# Patient Record
Sex: MALE | Race: WHITE
[De-identification: names, ages, dates, MRNs, and addresses within clinical notes are randomized per-mention and may not be internally consistent; named-entity substitution may affect disease eponyms.]

---

## 2020-02-13 ENCOUNTER — HOSPITAL ENCOUNTER (INPATIENT)
Dept: HOSPITAL 61 - ER | Age: 75
LOS: 1 days | Discharge: HOME | DRG: 178 | End: 2020-02-14
Attending: INTERNAL MEDICINE | Admitting: INTERNAL MEDICINE
Payer: COMMERCIAL

## 2020-02-13 VITALS — DIASTOLIC BLOOD PRESSURE: 69 MMHG | SYSTOLIC BLOOD PRESSURE: 116 MMHG

## 2020-02-13 VITALS — BODY MASS INDEX: 38.26 KG/M2 | HEIGHT: 68 IN | WEIGHT: 252.43 LBS

## 2020-02-13 VITALS — DIASTOLIC BLOOD PRESSURE: 50 MMHG | SYSTOLIC BLOOD PRESSURE: 99 MMHG

## 2020-02-13 DIAGNOSIS — I25.10: ICD-10-CM

## 2020-02-13 DIAGNOSIS — K21.9: ICD-10-CM

## 2020-02-13 DIAGNOSIS — R09.1: ICD-10-CM

## 2020-02-13 DIAGNOSIS — E66.9: ICD-10-CM

## 2020-02-13 DIAGNOSIS — R59.0: ICD-10-CM

## 2020-02-13 DIAGNOSIS — J15.6: Primary | ICD-10-CM

## 2020-02-13 DIAGNOSIS — Z79.899: ICD-10-CM

## 2020-02-13 DIAGNOSIS — Z79.84: ICD-10-CM

## 2020-02-13 DIAGNOSIS — I25.2: ICD-10-CM

## 2020-02-13 DIAGNOSIS — E83.42: ICD-10-CM

## 2020-02-13 DIAGNOSIS — J98.11: ICD-10-CM

## 2020-02-13 DIAGNOSIS — Z79.82: ICD-10-CM

## 2020-02-13 DIAGNOSIS — I10: ICD-10-CM

## 2020-02-13 DIAGNOSIS — G47.33: ICD-10-CM

## 2020-02-13 DIAGNOSIS — E11.9: ICD-10-CM

## 2020-02-13 LAB
ALBUMIN SERPL-MCNC: 3.8 G/DL (ref 3.4–5)
ALBUMIN/GLOB SERPL: 1.4 {RATIO} (ref 1–1.7)
ALP SERPL-CCNC: 45 U/L (ref 46–116)
ALT SERPL-CCNC: 32 U/L (ref 16–63)
ANION GAP SERPL CALC-SCNC: 11 MMOL/L (ref 6–14)
AST SERPL-CCNC: 23 U/L (ref 15–37)
BASOPHILS # BLD AUTO: 0 X10^3/UL (ref 0–0.2)
BASOPHILS NFR BLD: 0 % (ref 0–3)
BILIRUB SERPL-MCNC: 0.9 MG/DL (ref 0.2–1)
BUN SERPL-MCNC: 19 MG/DL (ref 8–26)
BUN/CREAT SERPL: 17 (ref 6–20)
CALCIUM SERPL-MCNC: 8.9 MG/DL (ref 8.5–10.1)
CHLORIDE SERPL-SCNC: 102 MMOL/L (ref 98–107)
CK SERPL-CCNC: 171 U/L (ref 39–308)
CO2 SERPL-SCNC: 28 MMOL/L (ref 21–32)
CREAT SERPL-MCNC: 1.1 MG/DL (ref 0.7–1.3)
EOSINOPHIL NFR BLD: 0 % (ref 0–3)
EOSINOPHIL NFR BLD: 0 X10^3/UL (ref 0–0.7)
ERYTHROCYTE [DISTWIDTH] IN BLOOD BY AUTOMATED COUNT: 13.6 % (ref 11.5–14.5)
GFR SERPLBLD BASED ON 1.73 SQ M-ARVRAT: 65.6 ML/MIN
GLOBULIN SER-MCNC: 2.8 G/DL (ref 2.2–3.8)
GLUCOSE SERPL-MCNC: 137 MG/DL (ref 70–99)
HCT VFR BLD CALC: 37 % (ref 39–53)
HGB BLD-MCNC: 12.6 G/DL (ref 13–17.5)
INFLUENZA A PATIENT: NEGATIVE
INFLUENZA B PATIENT: NEGATIVE
LYMPHOCYTES # BLD: 1.2 X10^3/UL (ref 1–4.8)
LYMPHOCYTES NFR BLD AUTO: 11 % (ref 24–48)
MAGNESIUM SERPL-MCNC: 1.4 MG/DL (ref 1.8–2.4)
MCH RBC QN AUTO: 31 PG (ref 25–35)
MCHC RBC AUTO-ENTMCNC: 34 G/DL (ref 31–37)
MCV RBC AUTO: 90 FL (ref 79–100)
MONO #: 1 X10^3/UL (ref 0–1.1)
MONOCYTES NFR BLD: 10 % (ref 0–9)
NEUT #: 8.4 X10^3/UL (ref 1.8–7.7)
NEUTROPHILS NFR BLD AUTO: 79 % (ref 31–73)
PLATELET # BLD AUTO: 221 X10^3/UL (ref 140–400)
POTASSIUM SERPL-SCNC: 3.9 MMOL/L (ref 3.5–5.1)
PROT SERPL-MCNC: 6.6 G/DL (ref 6.4–8.2)
RBC # BLD AUTO: 4.1 X10^6/UL (ref 4.3–5.7)
SODIUM SERPL-SCNC: 141 MMOL/L (ref 136–145)
WBC # BLD AUTO: 10.7 X10^3/UL (ref 4–11)

## 2020-02-13 PROCEDURE — 85025 COMPLETE CBC W/AUTO DIFF WBC: CPT

## 2020-02-13 PROCEDURE — 94640 AIRWAY INHALATION TREATMENT: CPT

## 2020-02-13 PROCEDURE — 84145 PROCALCITONIN (PCT): CPT

## 2020-02-13 PROCEDURE — G0378 HOSPITAL OBSERVATION PER HR: HCPCS

## 2020-02-13 PROCEDURE — 94760 N-INVAS EAR/PLS OXIMETRY 1: CPT

## 2020-02-13 PROCEDURE — 96367 TX/PROPH/DG ADDL SEQ IV INF: CPT

## 2020-02-13 PROCEDURE — 82962 GLUCOSE BLOOD TEST: CPT

## 2020-02-13 PROCEDURE — 80053 COMPREHEN METABOLIC PANEL: CPT

## 2020-02-13 PROCEDURE — 83735 ASSAY OF MAGNESIUM: CPT

## 2020-02-13 PROCEDURE — 87040 BLOOD CULTURE FOR BACTERIA: CPT

## 2020-02-13 PROCEDURE — 71045 X-RAY EXAM CHEST 1 VIEW: CPT

## 2020-02-13 PROCEDURE — 96365 THER/PROPH/DIAG IV INF INIT: CPT

## 2020-02-13 PROCEDURE — 93005 ELECTROCARDIOGRAM TRACING: CPT

## 2020-02-13 PROCEDURE — 71275 CT ANGIOGRAPHY CHEST: CPT

## 2020-02-13 PROCEDURE — 96366 THER/PROPH/DIAG IV INF ADDON: CPT

## 2020-02-13 PROCEDURE — 84484 ASSAY OF TROPONIN QUANT: CPT

## 2020-02-13 PROCEDURE — 83605 ASSAY OF LACTIC ACID: CPT

## 2020-02-13 PROCEDURE — 82553 CREATINE MB FRACTION: CPT

## 2020-02-13 PROCEDURE — 83880 ASSAY OF NATRIURETIC PEPTIDE: CPT

## 2020-02-13 PROCEDURE — 5A09357 ASSISTANCE WITH RESPIRATORY VENTILATION, LESS THAN 24 CONSECUTIVE HOURS, CONTINUOUS POSITIVE AIRWAY PRESSURE: ICD-10-PCS | Performed by: INTERNAL MEDICINE

## 2020-02-13 PROCEDURE — 36415 COLL VENOUS BLD VENIPUNCTURE: CPT

## 2020-02-13 PROCEDURE — 87804 INFLUENZA ASSAY W/OPTIC: CPT

## 2020-02-13 PROCEDURE — 96368 THER/DIAG CONCURRENT INF: CPT

## 2020-02-13 RX ADMIN — IPRATROPIUM BROMIDE AND ALBUTEROL SULFATE SCH ML: .5; 3 SOLUTION RESPIRATORY (INHALATION) at 21:09

## 2020-02-13 RX ADMIN — PIPERACILLIN SODIUM AND TAZOBACTAM SODIUM SCH MLS/HR: 4; .5 INJECTION, POWDER, LYOPHILIZED, FOR SOLUTION INTRAVENOUS at 23:03

## 2020-02-13 NOTE — NUR
Pt. arrived on unit at 1935 by bed from ER. Pt. complains of pain being 7/10 but feels 
comfortable. Admission assessment and questions were done at this time. Pt. is sitting in 
his chair with call light in reach. Will continue to monitor.

## 2020-02-13 NOTE — PDOC1
History and Physical


Date of Admission


Date of Admission


DATE: 2/13/20 


TIME: 21:05





Source


Source:  Chart review, Patient





History of Present Illness


History of Present Illness


 Raj is a 75  year old male  admit from  the ED today complaining of cough 

and shortness of breath that began on Monday. Patient reports being seen at 

urgent care on Monday and was given doxycycline and prednisone. He reports 

symptoms did not improve. He went back on Wednesday, he was given Tessalon 

Perles. He states symptoms have not improved he continues to have some symptoms.

Patient denies any fever. Denies any nasal congestion.





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





Past Medical History


Cardiovascular:  CAD (MI in 2003), HTN


Pulmonary:  No pertinent hx


Hepatobiliary:  No pertinent hx


Psych:  No pertinent hx


ENT:  No pertinent hx


Endocrine:  Diabetes


Dermatology:  No pertinent hx





Social History


Smoke:  No


ALCOHOL:  rare


Drugs:  None





Current Problem List


Problem List


Problems


Medical Problems:


(1) Hypomagnesemia


Status: Acute  





(2) Left lower lobe pneumonia


Status: Acute  











Current Medications


Current Medications





Current Medications


Piperacillin Sod/ Tazobactam Sod 4.5 gm/Sodium Chloride 100 ml @  200 mls/hr 1X 

ONCE IV  Last administered on 2/13/20at 16:10;  Start 2/13/20 at 15:45;  Stop 

2/13/20 at 16:14;  Status DC


Levofloxacin/ Dextrose 150 ml @  100 mls/hr 1X  ONCE IV  Last administered on 

2/13/20at 16:57;  Start 2/13/20 at 15:45;  Stop 2/13/20 at 17:14;  Status DC


Albuterol/ Ipratropium (Duoneb) 3 ml 1X  ONCE NEB  Last administered on 

2/13/20at 15:51;  Start 2/13/20 at 15:45;  Stop 2/13/20 at 16:04;  Status DC


Ondansetron HCl (Zofran) 4 mg PRN Q8HRS  PRN IV NAUSEA/VOMITING;  Start 2/13/20 

at 17:45;  Stop 2/14/20 at 17:44


Morphine Sulfate (Morphine Sulfate) 2 mg PRN Q2HR  PRN IV PAIN;  Start 2/13/20 

at 17:45;  Stop 2/14/20 at 17:44


Acetaminophen (Tylenol) 650 mg PRN Q4HRS  PRN PO FEVER;  Start 2/13/20 at 17:45;

 Stop 2/14/20 at 17:44


Albuterol/ Ipratropium (Duoneb) 3 ml RTQID NEB ;  Start 2/13/20 at 20:00;  Stop 

2/14/20 at 19:59


Magnesium Sulfate/ Dextrose 100 ml @  100 mls/hr 1X  ONCE IV  Last administered 

on 2/13/20at 18:23;  Start 2/13/20 at 17:45;  Stop 2/13/20 at 18:44;  Status DC


Magnesium Sulfate 50 ml @ 25 mls/hr 1X  ONCE IV ;  Start 2/13/20 at 21:00;  Stop

2/13/20 at 22:59


Metoprolol Tartrate (Lopressor) 25 mg BID PO ;  Start 2/13/20 at 21:00


Insulin Human Lispro (HumaLOG) 0-9 UNITS TIDWMEALS SQ ;  Start 2/14/20 at 08:00


Dextrose (Dextrose 50%-Water Syringe) 12.5 gm PRN Q15MIN  PRN IV SEE COMMENTS;  

Start 2/13/20 at 20:15


Dextrose (Iv Dextrose 5%) 250 ml PRN Q15MIN  PRN IV SEE COMMENTS;  Start 2/13/20

at 20:15


Guaifenesin/ Codeine Phosphate (Robitussin Ac) 10 ml PRN Q6HRS  PRN PO COUGH;  

Start 2/13/20 at 20:15


Benzonatate (Tessalon Perle) 100 mg PRN TID  PRN PO cough 1ST CHOICE;  Start 

2/13/20 at 20:15





Active Scripts


Active


Reported


Janumet 50-1,000 Mg Tablet (Sitagliptin Phos/Metformin Hcl) 1 Each Tablet 1 Tab 

PO BID


Janumet  Mg Tablet (Sitagliptin Phos/Metformin Hcl) 1 Each Tablet 1 Tab PO

BID


Lisinopril 10 Mg Tablet 1 Tab PO DAILYWBKFT


Toprol Xl (Metoprolol Succinate) 25 Mg Tab.er.24h 2 Tab PO DAILYWBKFT 30 Days


Flomax (Tamsulosin Hcl) 0.4 Mg Cap.er.24h 2 Cap PO HS


Simvastatin 80 Mg Tablet 1,040 Mg PO HS


Protonix (Pantoprazole Sodium) 20 Mg Tablet.dr 2 Tab PO DAILY


Aspirin 81 Mg Tab.chew 1 Tab PO DAILY





Allergies


Allergies:  


Coded Allergies:  


     No Known Drug Allergies (Unverified , 2/13/20)





ROS


General:  YES: Chills; 


   No: Night Sweats, Fatigue, Malaise, Appetite, Other


PSYCHOLOGICAL ROS:  No: Anxiety, Behavioral Disorder, Concentration difficultie,

Decreased libido, Depression, Disorientation, Hallucinations, Hostility, 

Irritablity, Memory difficulties, Mood Swings, Obsessive thoughts, Physical 

abuse, Sexual abuse, Sleep disturbances, Suicidal ideation, Other


Eyes:  No Blurry vision, No Decreased vision, No Double vision, No Dry eyes, No 

Excessive tearing, No Eye Pain, No Itchy Eyes, No Loss of vision, No 

Photophobia, No Scotomata, No Uses contacts, No Uses glasses, No Other


HEENT:  No: Heacaches, Visual Changes, Hearing change, Nasal congestion, Nasal 

discharge, Oral lesions, Sinus pain, Sore Throat, Epistaxis, Sneezing, Snoring, 

Tinnitus, Vertigo, Vocal changes, Other


Respiratory:  YES: Cough, Shortness of breath, SOB with excertion, Sputum 

Changes; 


   No: Hemoptysis, Orthopnea, Pleuritic Pain, Stridor, Tachypnea, Wheezing, 

Other


Cardiovascular:  No Chest Pain, No Palpitations, No Orthopnea, No Paroxysmal 

Noc. Dyspnea, No Edema, No Lt Headedness, No Other


Gastrointestinal:  No Nausea, No Vomiting, No Abdominal Pain, No Diarrhea, No 

Constipation, No Melena, No Hematochezia, No Other


Genitourinary:  No Dysuria, No Frequency, No Incontinence, No Hematuria, No 

Retention, No Discharge, No Urgency, No Pain, No Flank Pain, No Other, No , No ,

No , No , No , No , No 


Musculoskeletal:  Yes Joint Stiffness; 


   No Gait Disturbance, No Joint Pain, No Joint Swelling, No Muscle Pain, No 

Muscular Weakness, No Pain In:, No Swelling In:, No Other


Neurological:  No Behavorial Changes, No Bowel/Bladder ControlChng, No 

Confusion, No Dizziness, No Gait Disturbance, No Headaches, No Impaired 

Coord/balance, No Memory Loss, No Numbness/Tingling, No Seizures, No Speech 

Problems, No Tremors, No Visual Changes, No Weakness, No Other


Skin:  No Dry Skin, No Eczema, No Hair Changes, No Lumps, No Mole Changes, No 

Mottling, No Nail Changes, No Pruritus, No Rash, No Skin Lesion Changes, No 

Other, No Acne





Physical Exam


General:  Alert, Oriented X3, mild distress


HEENT:  Atraumatic, PERRLA, EOMI, Mucous membr. moist/pink


Lungs:  Clear to auscultation


Heart:  S1S2, RRR, no gallops


Extremities:  No clubbing, No edema


Skin:  No breakdown


Neuro:  Normal gait, Normal speech, Normal tone, Sensation intact


Psych/Mental Status:  Mood NL





Vitals


Vitals





Vital Signs








  Date Time  Temp Pulse Resp B/P (MAP) Pulse Ox O2 Delivery O2 Flow Rate FiO2


 


2/13/20 19:30 97.9 63 22 116/69 (85) 94 Room Air  





 97.9       


 


2/13/20 19:00       2.0 











Labs


Labs





Laboratory Tests








Test


 2/13/20


16:00 2/13/20


16:15 2/13/20


20:57


 


White Blood Count


 10.7 x10^3/uL


(4.0-11.0) 


 





 


Red Blood Count


 4.10 x10^6/uL


(4.30-5.70) 


 





 


Hemoglobin


 12.6 g/dL


(13.0-17.5) 


 





 


Hematocrit


 37.0 %


(39.0-53.0) 


 





 


Mean Corpuscular Volume 90 fL ()   


 


Mean Corpuscular Hemoglobin 31 pg (25-35)   


 


Mean Corpuscular Hemoglobin


Concent 34 g/dL


(31-37) 


 





 


Red Cell Distribution Width


 13.6 %


(11.5-14.5) 


 





 


Platelet Count


 221 x10^3/uL


(140-400) 


 





 


Neutrophils (%) (Auto) 79 % (31-73)   


 


Lymphocytes (%) (Auto) 11 % (24-48)   


 


Monocytes (%) (Auto) 10 % (0-9)   


 


Eosinophils (%) (Auto) 0 % (0-3)   


 


Basophils (%) (Auto) 0 % (0-3)   


 


Neutrophils # (Auto)


 8.4 x10^3/uL


(1.8-7.7) 


 





 


Lymphocytes # (Auto)


 1.2 x10^3/uL


(1.0-4.8) 


 





 


Monocytes # (Auto)


 1.0 x10^3/uL


(0.0-1.1) 


 





 


Eosinophils # (Auto)


 0.0 x10^3/uL


(0.0-0.7) 


 





 


Basophils # (Auto)


 0.0 x10^3/uL


(0.0-0.2) 


 





 


Sodium Level


 141 mmol/L


(136-145) 


 





 


Potassium Level


 3.9 mmol/L


(3.5-5.1) 


 





 


Chloride Level


 102 mmol/L


() 


 





 


Carbon Dioxide Level


 28 mmol/L


(21-32) 


 





 


Anion Gap 11 (6-14)   


 


Blood Urea Nitrogen


 19 mg/dL


(8-26) 


 





 


Creatinine


 1.1 mg/dL


(0.7-1.3) 


 





 


Estimated GFR


(Cockcroft-Gault) 65.6 


 


 





 


BUN/Creatinine Ratio 17 (6-20)   


 


Glucose Level


 137 mg/dL


(70-99) 


 





 


Lactic Acid Level


 2.0 mmol/L


(0.4-2.0) 


 





 


Calcium Level


 8.9 mg/dL


(8.5-10.1) 


 





 


Magnesium Level


 1.4 mg/dL


(1.8-2.4) 


 





 


Total Bilirubin


 0.9 mg/dL


(0.2-1.0) 


 





 


Aspartate Amino Transf


(AST/SGOT) 23 U/L (15-37) 


 


 





 


Alanine Aminotransferase


(ALT/SGPT) 32 U/L (16-63) 


 


 





 


Alkaline Phosphatase


 45 U/L


() 


 





 


Creatine Kinase


 171 U/L


() 


 





 


Creatine Kinase MB (Mass)


 1.5 ng/mL


(0.0-3.6) 


 





 


Creatine Kinase MB Relative


Index 0.9 % (0-4) 


 


 





 


Troponin I Quantitative


 < 0.017 ng/mL


(0.000-0.055) 


 





 


NT-Pro-B-Type Natriuretic


Peptide 939 pg/mL


(0-124) 


 





 


Total Protein


 6.6 g/dL


(6.4-8.2) 


 





 


Albumin


 3.8 g/dL


(3.4-5.0) 


 





 


Albumin/Globulin Ratio 1.4 (1.0-1.7)   


 


Procalcitonin


 < 0.10 ng/mL


(0.00-0.10) 


 





 


Influenza Type A Antigen


 


 Negative


(NEGATIVE) 





 


Influenza Type B Antigen


 


 Negative


(NEGATIVE) 





 


Glucose (Fingerstick)


 


 


 205 mg/dL


(70-99)








Laboratory Tests








Test


 2/13/20


16:00 2/13/20


16:15 2/13/20


20:57


 


White Blood Count


 10.7 x10^3/uL


(4.0-11.0) 


 





 


Red Blood Count


 4.10 x10^6/uL


(4.30-5.70) 


 





 


Hemoglobin


 12.6 g/dL


(13.0-17.5) 


 





 


Hematocrit


 37.0 %


(39.0-53.0) 


 





 


Mean Corpuscular Volume 90 fL ()   


 


Mean Corpuscular Hemoglobin 31 pg (25-35)   


 


Mean Corpuscular Hemoglobin


Concent 34 g/dL


(31-37) 


 





 


Red Cell Distribution Width


 13.6 %


(11.5-14.5) 


 





 


Platelet Count


 221 x10^3/uL


(140-400) 


 





 


Neutrophils (%) (Auto) 79 % (31-73)   


 


Lymphocytes (%) (Auto) 11 % (24-48)   


 


Monocytes (%) (Auto) 10 % (0-9)   


 


Eosinophils (%) (Auto) 0 % (0-3)   


 


Basophils (%) (Auto) 0 % (0-3)   


 


Neutrophils # (Auto)


 8.4 x10^3/uL


(1.8-7.7) 


 





 


Lymphocytes # (Auto)


 1.2 x10^3/uL


(1.0-4.8) 


 





 


Monocytes # (Auto)


 1.0 x10^3/uL


(0.0-1.1) 


 





 


Eosinophils # (Auto)


 0.0 x10^3/uL


(0.0-0.7) 


 





 


Basophils # (Auto)


 0.0 x10^3/uL


(0.0-0.2) 


 





 


Sodium Level


 141 mmol/L


(136-145) 


 





 


Potassium Level


 3.9 mmol/L


(3.5-5.1) 


 





 


Chloride Level


 102 mmol/L


() 


 





 


Carbon Dioxide Level


 28 mmol/L


(21-32) 


 





 


Anion Gap 11 (6-14)   


 


Blood Urea Nitrogen


 19 mg/dL


(8-26) 


 





 


Creatinine


 1.1 mg/dL


(0.7-1.3) 


 





 


Estimated GFR


(Cockcroft-Gault) 65.6 


 


 





 


BUN/Creatinine Ratio 17 (6-20)   


 


Glucose Level


 137 mg/dL


(70-99) 


 





 


Lactic Acid Level


 2.0 mmol/L


(0.4-2.0) 


 





 


Calcium Level


 8.9 mg/dL


(8.5-10.1) 


 





 


Magnesium Level


 1.4 mg/dL


(1.8-2.4) 


 





 


Total Bilirubin


 0.9 mg/dL


(0.2-1.0) 


 





 


Aspartate Amino Transf


(AST/SGOT) 23 U/L (15-37) 


 


 





 


Alanine Aminotransferase


(ALT/SGPT) 32 U/L (16-63) 


 


 





 


Alkaline Phosphatase


 45 U/L


() 


 





 


Creatine Kinase


 171 U/L


() 


 





 


Creatine Kinase MB (Mass)


 1.5 ng/mL


(0.0-3.6) 


 





 


Creatine Kinase MB Relative


Index 0.9 % (0-4) 


 


 





 


Troponin I Quantitative


 < 0.017 ng/mL


(0.000-0.055) 


 





 


NT-Pro-B-Type Natriuretic


Peptide 939 pg/mL


(0-124) 


 





 


Total Protein


 6.6 g/dL


(6.4-8.2) 


 





 


Albumin


 3.8 g/dL


(3.4-5.0) 


 





 


Albumin/Globulin Ratio 1.4 (1.0-1.7)   


 


Procalcitonin


 < 0.10 ng/mL


(0.00-0.10) 


 





 


Influenza Type A Antigen


 


 Negative


(NEGATIVE) 





 


Influenza Type B Antigen


 


 Negative


(NEGATIVE) 





 


Glucose (Fingerstick)


 


 


 205 mg/dL


(70-99)











VTE Prophylaxis Ordered


VTE Prophylaxis Devices:  No


VTE Pharmacological Prophylaxi:  Yes





Assessment/Plan


Assessment/Plan


cough


outpatient pneumonia,  cough and dyspnea, not improved


dm2,  will have SSI


obese, BMI 38


EVELIN,  he brought his CPAP


hypomag,  replaced





consutl PULM


admit obs











AYANNA GREEN MD                 Feb 13, 2020 21:09

## 2020-02-13 NOTE — PHYS DOC
Past Medical History


Past Medical History:  MI





Adult General


Chief Complaint


Chief Complaint:  SHORTNESS OF BREATH





HPI


HPI





Patient is a 75  year old male with history of MI, pacemaker, among other 

illnesses who presents to the ED today complaining of cough and shortness of 

breath that began on Monday. Patient reports being seen at urgent care on Monday

and was given doxycycline and prednisone. He reports symptoms did not improve. 

He went back on Wednesday, he was given Tessalon Perles. He states symptoms have

not improved he continues to have some symptoms. Patient denies any fever. 

Denies any nasal congestion.





PCP Dr. Lauren





Review of Systems


Review of Systems





Constitutional: Denies fever or chills []


Eyes: Denies change in visual acuity, redness, or eye pain []


HENT: Denies nasal congestion or sore throat []


Respiratory: Reports cough and shortness of breath []


Cardiovascular: No additional information not addressed in HPI []


GI: Denies abdominal pain, nausea, vomiting, bloody stools or diarrhea []


: Denies dysuria or hematuria []


Musculoskeletal: Denies back pain or joint pain []


Integument: Denies rash or skin lesions []


Neurologic: Denies headache, focal weakness or sensory changes []








All other systems were reviewed and found to be within normal limits, except as 

documented in this note.





Current Medications


Current Medications





Current Medications








 Medications


  (Trade)  Dose


 Ordered  Sig/Barbara  Start Time


 Stop Time Status Last Admin


Dose Admin


 


 Acetaminophen


  (Tylenol)  650 mg  PRN Q4HRS  PRN  2/13/20 17:45


 2/14/20 17:44   





 


 Albuterol/


 Ipratropium


  (Duoneb)  3 ml  RTQID  2/13/20 20:00


 2/14/20 19:59   





 


 Levofloxacin/


 Dextrose  150 ml @ 


 100 mls/hr  1X  ONCE  2/13/20 15:45


 2/13/20 17:14 DC 2/13/20 16:57


100 MLS/HR


 


 Magnesium Sulfate/


 Dextrose  100 ml @ 


 100 mls/hr  1X  ONCE  2/13/20 17:45


 2/13/20 18:44   





 


 Morphine Sulfate


  (Morphine


 Sulfate)  2 mg  PRN Q2HR  PRN  2/13/20 17:45


 2/14/20 17:44   





 


 Ondansetron HCl


  (Zofran)  4 mg  PRN Q8HRS  PRN  2/13/20 17:45


 2/14/20 17:44   





 


 Piperacillin Sod/


 Tazobactam Sod


 4.5 gm/Sodium


 Chloride  100 ml @ 


 200 mls/hr  1X  ONCE  2/13/20 15:45


 2/13/20 16:14 DC 2/13/20 16:10


200 MLS/HR











Allergies


Allergies





Allergies








Coded Allergies Type Severity Reaction Last Updated Verified


 


  No Known Drug Allergies    2/13/20 No











Physical Exam


Physical Exam





Constitutional: Well developed, well nourished, no acute distress, non-toxic 

appearance. []


HENT: Normocephalic, atraumatic, bilateral external ears normal, oropharynx 

moist, no oral exudates, nose normal. []


Eyes: PERRLA, EOMI, conjunctiva normal, no discharge. [] 


Neck: Normal range of motion, no tenderness, supple, no stridor. [] 


Cardiovascular: Left upper chest with the pacemaker. Heart rate regular rhythm


Lungs & Thorax:  Bilateral breath sounds clear to auscultation []


Abdomen: Bowel sounds normal, soft, no tenderness, no masses, no pulsatile 

masses. [] 


Skin: Warm, dry, no erythema, no rash. [] 


Back: No tenderness, no CVA tenderness. [] 


Extremities: No tenderness, no cyanosis, no clubbing, ROM intact, no edema. [] 


Neurologic: Alert and oriented X 3, normal motor function, normal sensory 

function, no focal deficits noted. []


Psychologic: Affect normal, judgement normal, mood normal. []





Current Patient Data


Vital Signs





                                   Vital Signs








  Date Time  Temp Pulse Resp B/P (MAP) Pulse Ox O2 Delivery O2 Flow Rate FiO2


 


2/13/20 15:51     93 Room Air  








Lab Values





                                Laboratory Tests








Test


 2/13/20


16:00 2/13/20


16:15


 


White Blood Count


 10.7 x10^3/uL


(4.0-11.0) 





 


Red Blood Count


 4.10 x10^6/uL


(4.30-5.70)  L 





 


Hemoglobin


 12.6 g/dL


(13.0-17.5)  L 





 


Hematocrit


 37.0 %


(39.0-53.0)  L 





 


Mean Corpuscular Volume


 90 fL ()


 





 


Mean Corpuscular Hemoglobin 31 pg (25-35)   


 


Mean Corpuscular Hemoglobin


Concent 34 g/dL


(31-37) 





 


Red Cell Distribution Width


 13.6 %


(11.5-14.5) 





 


Platelet Count


 221 x10^3/uL


(140-400) 





 


Neutrophils (%) (Auto) 79 % (31-73)  H 


 


Lymphocytes (%) (Auto) 11 % (24-48)  L 


 


Monocytes (%) (Auto) 10 % (0-9)  H 


 


Eosinophils (%) (Auto) 0 % (0-3)   


 


Basophils (%) (Auto) 0 % (0-3)   


 


Neutrophils # (Auto)


 8.4 x10^3/uL


(1.8-7.7)  H 





 


Lymphocytes # (Auto)


 1.2 x10^3/uL


(1.0-4.8) 





 


Monocytes # (Auto)


 1.0 x10^3/uL


(0.0-1.1) 





 


Eosinophils # (Auto)


 0.0 x10^3/uL


(0.0-0.7) 





 


Basophils # (Auto)


 0.0 x10^3/uL


(0.0-0.2) 





 


Sodium Level


 141 mmol/L


(136-145) 





 


Potassium Level


 3.9 mmol/L


(3.5-5.1) 





 


Chloride Level


 102 mmol/L


() 





 


Carbon Dioxide Level


 28 mmol/L


(21-32) 





 


Anion Gap 11 (6-14)   


 


Blood Urea Nitrogen


 19 mg/dL


(8-26) 





 


Creatinine


 1.1 mg/dL


(0.7-1.3) 





 


Estimated GFR


(Cockcroft-Gault) 65.6  


 





 


BUN/Creatinine Ratio 17 (6-20)   


 


Glucose Level


 137 mg/dL


(70-99)  H 





 


Lactic Acid Level


 2.0 mmol/L


(0.4-2.0) 





 


Calcium Level


 8.9 mg/dL


(8.5-10.1) 





 


Magnesium Level


 1.4 mg/dL


(1.8-2.4)  L 





 


Total Bilirubin


 0.9 mg/dL


(0.2-1.0) 





 


Aspartate Amino Transferase


(AST) 23 U/L (15-37)


 





 


Alanine Aminotransferase (ALT)


 32 U/L (16-63)


 





 


Alkaline Phosphatase


 45 U/L


()  L 





 


Creatine Kinase


 171 U/L


() 





 


Creatine Kinase MB (Mass)


 1.5 ng/mL


(0.0-3.6) 





 


Creatine Kinase MB Relative


Index 0.9 % (0-4)  


 





 


Troponin I Quantitative


 < 0.017 ng/mL


(0.000-0.055) 





 


NT-Pro-B-Type Natriuretic


Peptide 939 pg/mL


(0-124)  H 





 


Total Protein


 6.6 g/dL


(6.4-8.2) 





 


Albumin


 3.8 g/dL


(3.4-5.0) 





 


Albumin/Globulin Ratio 1.4 (1.0-1.7)   


 


Procalcitonin


 < 0.10 ng/mL


(0.00-0.10) 





 


Influenza Type A Antigen


 


 Negative


(NEGATIVE)


 


Influenza Type B Antigen


 


 Negative


(NEGATIVE)





                                Laboratory Tests


2/13/20 16:00








                                Laboratory Tests


2/13/20 16:00











EKG


EKG


1542 interpreted by Dr. Levine sinus rhythm HR 78 no STEMI[]





Radiology/Procedures


Radiology/Procedures


[]PROCEDURE: PORTABLE CHEST 1V





Examination: PORTABLE CHEST 1V


 


History: Cough


 


Comparison/Correlation: None


 


Findings: Portable frontal upright view of the chest was obtained. 


Dual-lead left-sided pacemaker is present. No pneumothorax. Right lung 


field is clear. Heart size is normal. Retrocardiac left basilar 


atelectasis/infiltrate is present. No significant pleural effusion. Bony 


structures are intact.


 


 


Impression:


Retrocardiac left basilar atelectasis/infiltrate.


 


Electronically signed by: Yuri Augustin MD (2/13/2020 3:52 PM) [id number]














DICTATED and SIGNED BY:     YURI AUGUSTIN MD


DATE:     02/13/20 1552





Course & Med Decision Making


Course & Med Decision Making


Pertinent Labs and Imaging studies reviewed. (See chart for details)





This is a 75-year-old male patient presenting to the ED today complaining of 

shortness of breath and a cough that began Monday. Patient was diagnosed with 

pneumonia on Monday, was put on doxycycline and prednisone, no improvement of 

symptoms, went back to urgent care on Wednesday, was given Tessalon Perles. No 

improvement of symptoms.





CBC with a normal WBC, CMP with magnesium of 1.4, lactic is normal. Patient is 

afebrile. Patient was given magnesium replacement in the ED.  chest x-ray was 

noted for retrocardiac left basilar atelectasis/infiltrate. Patient was also giv

en Zosyn and Levaquin in the ED.





1730 Spoke with Dr. Torre who accepted patient for admission.





Dragon Disclaimer


Dragon Disclaimer


This electronic medical record was generated, in whole or in part, using a voice

 recognition dictation system.





Departure


Departure


Impression:  


   Primary Impression:  


   Left lower lobe pneumonia


   Additional Impression:  


   Hypomagnesemia


Disposition:  09 ADMITTED AS INPATIENT


Condition:  STABLE


Referrals:  


SHERRY LAUREN (PCP)





Problem Qualifiers








   Primary Impression:  


   Left lower lobe pneumonia


   Pneumonia type:  due to unspecified organism  Qualified Codes:  J18.9 - 

   Pneumonia, unspecified organism








SRIDEVI MELVIN              Feb 13, 2020 15:42

## 2020-02-13 NOTE — NUR
Pt. refused lovenox tonight. RN explained what it was for but patient would like to speak to 
MD before taking it.

## 2020-02-13 NOTE — RAD
Examination: PORTABLE CHEST 1V

 

History: Cough

 

Comparison/Correlation: None

 

Findings: Portable frontal upright view of the chest was obtained. 

Dual-lead left-sided pacemaker is present. No pneumothorax. Right lung 

field is clear. Heart size is normal. Retrocardiac left basilar 

atelectasis/infiltrate is present. No significant pleural effusion. Bony 

structures are intact.

 

 

Impression:

Retrocardiac left basilar atelectasis/infiltrate.

 

Electronically signed by: Yuri Navarro MD (2/13/2020 3:52 PM) [id number]

## 2020-02-13 NOTE — NUR
Mu-ism. Doesn't eat pork.

-------------------------------------------------------------------------------

Addendum: 02/13/20 at 2010 by LORENA JARAMILLO RN

-------------------------------------------------------------------------------

Amended: Links added.

## 2020-02-13 NOTE — EKG
[facility]

              [address] [place] [address]

Test Date:    2020               Test Time:    15:38:07

Pat Name:     RICHARDSON LUX           Department:   

Patient ID:   [id number]           Room:          

Gender:       M                        Technician:   

:          1945               Requested By: SRIDEVI MELVIN

Order Number: [id number]           Reading MD:     

                                 Measurements

Intervals                              Axis          

Rate:         78                       P:            

AL:                                    QRS:          5

QRSD:         92                       T:            18

QT:           370                                    

QTc:          425                                    

                           Interpretive Statements

IRREGULAR RHYTHM, NO P-WAVE FOUND

LOW LIMB LEAD VOLTAGE

NO SPECIFIC ECG ABNORMALITIES

RI6.01

No previous ECG available for comparison

## 2020-02-14 VITALS — DIASTOLIC BLOOD PRESSURE: 74 MMHG | SYSTOLIC BLOOD PRESSURE: 116 MMHG

## 2020-02-14 VITALS — SYSTOLIC BLOOD PRESSURE: 122 MMHG | DIASTOLIC BLOOD PRESSURE: 64 MMHG

## 2020-02-14 VITALS — SYSTOLIC BLOOD PRESSURE: 117 MMHG | DIASTOLIC BLOOD PRESSURE: 49 MMHG

## 2020-02-14 VITALS — DIASTOLIC BLOOD PRESSURE: 53 MMHG | SYSTOLIC BLOOD PRESSURE: 107 MMHG

## 2020-02-14 VITALS — SYSTOLIC BLOOD PRESSURE: 109 MMHG | DIASTOLIC BLOOD PRESSURE: 56 MMHG

## 2020-02-14 LAB
BASOPHILS # BLD AUTO: 0 X10^3/UL (ref 0–0.2)
BASOPHILS NFR BLD: 0 % (ref 0–3)
EOSINOPHIL NFR BLD: 0 % (ref 0–3)
EOSINOPHIL NFR BLD: 0 X10^3/UL (ref 0–0.7)
ERYTHROCYTE [DISTWIDTH] IN BLOOD BY AUTOMATED COUNT: 13.6 % (ref 11.5–14.5)
HCT VFR BLD CALC: 32.7 % (ref 39–53)
HGB BLD-MCNC: 11.6 G/DL (ref 13–17.5)
LYMPHOCYTES # BLD: 0.9 X10^3/UL (ref 1–4.8)
LYMPHOCYTES NFR BLD AUTO: 11 % (ref 24–48)
MCH RBC QN AUTO: 32 PG (ref 25–35)
MCHC RBC AUTO-ENTMCNC: 36 G/DL (ref 31–37)
MCV RBC AUTO: 89 FL (ref 79–100)
MONO #: 0.8 X10^3/UL (ref 0–1.1)
MONOCYTES NFR BLD: 11 % (ref 0–9)
NEUT #: 6 X10^3/UL (ref 1.8–7.7)
NEUTROPHILS NFR BLD AUTO: 78 % (ref 31–73)
PLATELET # BLD AUTO: 196 X10^3/UL (ref 140–400)
RBC # BLD AUTO: 3.67 X10^6/UL (ref 4.3–5.7)
WBC # BLD AUTO: 7.7 X10^3/UL (ref 4–11)

## 2020-02-14 RX ADMIN — INSULIN LISPRO SCH UNITS: 100 INJECTION, SOLUTION INTRAVENOUS; SUBCUTANEOUS at 12:00

## 2020-02-14 RX ADMIN — IPRATROPIUM BROMIDE AND ALBUTEROL SULFATE SCH ML: .5; 3 SOLUTION RESPIRATORY (INHALATION) at 07:11

## 2020-02-14 RX ADMIN — IPRATROPIUM BROMIDE AND ALBUTEROL SULFATE SCH ML: .5; 3 SOLUTION RESPIRATORY (INHALATION) at 16:05

## 2020-02-14 RX ADMIN — PIPERACILLIN SODIUM AND TAZOBACTAM SODIUM SCH MLS/HR: 4; .5 INJECTION, POWDER, LYOPHILIZED, FOR SOLUTION INTRAVENOUS at 12:16

## 2020-02-14 RX ADMIN — INSULIN LISPRO SCH UNITS: 100 INJECTION, SOLUTION INTRAVENOUS; SUBCUTANEOUS at 08:00

## 2020-02-14 RX ADMIN — PIPERACILLIN SODIUM AND TAZOBACTAM SODIUM SCH MLS/HR: 4; .5 INJECTION, POWDER, LYOPHILIZED, FOR SOLUTION INTRAVENOUS at 06:03

## 2020-02-14 RX ADMIN — IPRATROPIUM BROMIDE AND ALBUTEROL SULFATE SCH ML: .5; 3 SOLUTION RESPIRATORY (INHALATION) at 10:43

## 2020-02-14 NOTE — PDOC3
Discharge Summary


Visit Information


Date of Admission:  Feb 13, 2020


Date of Discharge:  Feb 14, 2020


Final Diagnosis


cough


outpatient pneumonia,  cough and dyspnea, not improved


dm2,  will have SSI


obese, BMI 38


EVELIN,  he brought his CPAP


hypomag,  





 








Problems


Medical Problems:


(1) Hypomagnesemia


Status: Acute  





(2) Left lower lobe pneumonia


Status: Acute  








Brief Hospital Course


Allergies





                                    Allergies








Coded Allergies Type Severity Reaction Last Updated Verified


 


  No Known Drug Allergies    2/13/20 No








Vital Signs





Vital Signs








  Date Time  Temp Pulse Resp B/P (MAP) Pulse Ox O2 Delivery O2 Flow Rate FiO2


 


2/14/20 16:05      Room Air  


 


2/14/20 15:15 98.6 89 18 107/53 (71) 94   





 98.6       


 


2/13/20 19:00       2.0 








Lab Results





Laboratory Tests








Test


 2/13/20


16:00 2/13/20


16:15 2/13/20


20:57 2/14/20


03:30


 


White Blood Count


 10.7 x10^3/uL


(4.0-11.0) 


 


 7.7 x10^3/uL


(4.0-11.0)


 


Red Blood Count


 4.10 x10^6/uL


(4.30-5.70) 


 


 3.67 x10^6/uL


(4.30-5.70)


 


Hemoglobin


 12.6 g/dL


(13.0-17.5) 


 


 11.6 g/dL


(13.0-17.5)


 


Hematocrit


 37.0 %


(39.0-53.0) 


 


 32.7 %


(39.0-53.0)


 


Mean Corpuscular Volume 90 fL ()    89 fL () 


 


Mean Corpuscular Hemoglobin 31 pg (25-35)    32 pg (25-35) 


 


Mean Corpuscular Hemoglobin


Concent 34 g/dL


(31-37) 


 


 36 g/dL


(31-37)


 


Red Cell Distribution Width


 13.6 %


(11.5-14.5) 


 


 13.6 %


(11.5-14.5)


 


Platelet Count


 221 x10^3/uL


(140-400) 


 


 196 x10^3/uL


(140-400)


 


Neutrophils (%) (Auto) 79 % (31-73)    78 % (31-73) 


 


Lymphocytes (%) (Auto) 11 % (24-48)    11 % (24-48) 


 


Monocytes (%) (Auto) 10 % (0-9)    11 % (0-9) 


 


Eosinophils (%) (Auto) 0 % (0-3)    0 % (0-3) 


 


Basophils (%) (Auto) 0 % (0-3)    0 % (0-3) 


 


Neutrophils # (Auto)


 8.4 x10^3/uL


(1.8-7.7) 


 


 6.0 x10^3/uL


(1.8-7.7)


 


Lymphocytes # (Auto)


 1.2 x10^3/uL


(1.0-4.8) 


 


 0.9 x10^3/uL


(1.0-4.8)


 


Monocytes # (Auto)


 1.0 x10^3/uL


(0.0-1.1) 


 


 0.8 x10^3/uL


(0.0-1.1)


 


Eosinophils # (Auto)


 0.0 x10^3/uL


(0.0-0.7) 


 


 0.0 x10^3/uL


(0.0-0.7)


 


Basophils # (Auto)


 0.0 x10^3/uL


(0.0-0.2) 


 


 0.0 x10^3/uL


(0.0-0.2)


 


Sodium Level


 141 mmol/L


(136-145) 


 


 





 


Potassium Level


 3.9 mmol/L


(3.5-5.1) 


 


 





 


Chloride Level


 102 mmol/L


() 


 


 





 


Carbon Dioxide Level


 28 mmol/L


(21-32) 


 


 





 


Anion Gap 11 (6-14)    


 


Blood Urea Nitrogen


 19 mg/dL


(8-26) 


 


 





 


Creatinine


 1.1 mg/dL


(0.7-1.3) 


 


 





 


Estimated GFR


(Cockcroft-Gault) 65.6 


 


 


 





 


BUN/Creatinine Ratio 17 (6-20)    


 


Glucose Level


 137 mg/dL


(70-99) 


 


 





 


Lactic Acid Level


 2.0 mmol/L


(0.4-2.0) 


 


 





 


Calcium Level


 8.9 mg/dL


(8.5-10.1) 


 


 





 


Magnesium Level


 1.4 mg/dL


(1.8-2.4) 


 


 





 


Total Bilirubin


 0.9 mg/dL


(0.2-1.0) 


 


 





 


Aspartate Amino Transf


(AST/SGOT) 23 U/L (15-37) 


 


 


 





 


Alanine Aminotransferase


(ALT/SGPT) 32 U/L (16-63) 


 


 


 





 


Alkaline Phosphatase


 45 U/L


() 


 


 





 


Creatine Kinase


 171 U/L


() 


 


 





 


Creatine Kinase MB (Mass)


 1.5 ng/mL


(0.0-3.6) 


 


 





 


Creatine Kinase MB Relative


Index 0.9 % (0-4) 


 


 


 





 


Troponin I Quantitative


 < 0.017 ng/mL


(0.000-0.055) 


 


 





 


NT-Pro-B-Type Natriuretic


Peptide 939 pg/mL


(0-124) 


 


 





 


Total Protein


 6.6 g/dL


(6.4-8.2) 


 


 





 


Albumin


 3.8 g/dL


(3.4-5.0) 


 


 





 


Albumin/Globulin Ratio 1.4 (1.0-1.7)    


 


Procalcitonin


 < 0.10 ng/mL


(0.00-0.10) 


 


 





 


Influenza Type A Antigen


 


 Negative


(NEGATIVE) 


 





 


Influenza Type B Antigen


 


 Negative


(NEGATIVE) 


 





 


Glucose (Fingerstick)


 


 


 205 mg/dL


(70-99) 





 


Test


 2/14/20


07:49 2/14/20


11:56 


 





 


Glucose (Fingerstick)


 133 mg/dL


(70-99) 117 mg/dL


(70-99) 


 











Laboratory Tests








Test


 2/13/20


20:57 2/14/20


03:30 2/14/20


07:49 2/14/20


11:56


 


Glucose (Fingerstick)


 205 mg/dL


(70-99) 


 133 mg/dL


(70-99) 117 mg/dL


(70-99)


 


White Blood Count


 


 7.7 x10^3/uL


(4.0-11.0) 


 





 


Red Blood Count


 


 3.67 x10^6/uL


(4.30-5.70) 


 





 


Hemoglobin


 


 11.6 g/dL


(13.0-17.5) 


 





 


Hematocrit


 


 32.7 %


(39.0-53.0) 


 





 


Mean Corpuscular Volume  89 fL ()   


 


Mean Corpuscular Hemoglobin  32 pg (25-35)   


 


Mean Corpuscular Hemoglobin


Concent 


 36 g/dL


(31-37) 


 





 


Red Cell Distribution Width


 


 13.6 %


(11.5-14.5) 


 





 


Platelet Count


 


 196 x10^3/uL


(140-400) 


 





 


Neutrophils (%) (Auto)  78 % (31-73)   


 


Lymphocytes (%) (Auto)  11 % (24-48)   


 


Monocytes (%) (Auto)  11 % (0-9)   


 


Eosinophils (%) (Auto)  0 % (0-3)   


 


Basophils (%) (Auto)  0 % (0-3)   


 


Neutrophils # (Auto)


 


 6.0 x10^3/uL


(1.8-7.7) 


 





 


Lymphocytes # (Auto)


 


 0.9 x10^3/uL


(1.0-4.8) 


 





 


Monocytes # (Auto)


 


 0.8 x10^3/uL


(0.0-1.1) 


 





 


Eosinophils # (Auto)


 


 0.0 x10^3/uL


(0.0-0.7) 


 





 


Basophils # (Auto)


 


 0.0 x10^3/uL


(0.0-0.2) 


 











Brief Hospital Course


Mr. Red  is a 75 old male,  admit with cough and dyspnea,   outpatient 

pneumonia that did not improve with doxy PO





Discharge Information


Scheduled


Aspirin (Aspirin) 81 Mg Tab.chew, 1 TAB PO DAILY for MI, #30 Ref 3 (Reported)


   Entered as Reported by: LORENA JARAMILLO on 2/13/20 2028


   Last Taken: Unknown Dose on 2/12/20      Last Action: Continued on 2/13/20 2218 by AYANNA GREEN


Ezetimibe/Simvastatin (Vytorin 10-40 Mg Tablet) 1 Each Tablet, 1 TAB PO HS for 

cholesterol for 30 Days, #30 Ref 0 (Reported)


   Entered as Reported by: LORENA JARAMILLO on 2/13/20 2211


   Last Taken: Unknown Dose on 2/12/20      Last Action: Converted on 2/13/20 2218 by AYANNA MARSH


Levofloxacin (Levaquin) 750 Mg Tablet, 1 TAB PO DAILY for pneumonia for 8 Days, 

#8 Ref 0


   Prescribed by: AYANNA GREEN on 2/14/20 1226


Lisinopril (Lisinopril) 10 Mg Tablet, 1 TAB PO DAILYWBKFT for blood pressure, 

#30 Ref 5 (Reported)


   Entered as Reported by: LORENA JARAMILLO on 2/13/20 2028


   Last Taken: Unknown Dose on 2/12/20      Last Action: Continued on 2/13/20 2218 by AYANNA MARSH


Metoprolol Succinate (Toprol Xl) 25 Mg Tab.er.24h, 2 TAB PO DAILYWBKFT for blood

pressure for 30 Days, #60 Ref 0 (Reported)


   Entered as Reported by: LORENA JARAMILLO on 2/13/20 2028


   Last Taken: Unknown Dose on 2/12/20      Last Action: Continued on 2/13/20 2218 by AYANNA MARSH


Pantoprazole Sodium (Protonix) 20 Mg Tablet.dr, 2 TAB PO DAILY for acid reflux, 

#30 (Reported)


   Entered as Reported by: LORENA JARAMILLO on 2/13/20 2028


   Last Taken: Unknown Dose on 2/12/20      Last Action: Converted on 2/13/20 2218 by AYANNA MARSH


Sitagliptin Phos/Metformin Hcl (Janumet 50-1,000 Mg Tablet) 1 Each Tablet, 1 TAB

PO BID for diabetes, #60 Ref 5 (Reported)


   Entered as Reported by: LORENA JARAMILLO on 2/13/20 2028


   Last Taken: Unknown Dose on 2/12/20      Last Action: Converted on 2/13/20 2218 by AYANNA GREEN


Tamsulosin Hcl (Flomax) 0.4 Mg Cap.er.24h, 2 CAP PO HS for retention, #30 Ref 11

(Reported)


   Entered as Reported by: LORENA JARAMILLO on 2/13/20 2028


   Last Taken: Unknown Dose on 2/12/20      Last Action: Continued on 2/13/20 2218 by AYANNA MARSH





Patient Instructions


Patient Instructions


time > 30 min,  face to face


and phone call later to discuss CT scan findings








CTANGIO





IMPRESSION: No pulmonary embolism.


 


Consolidative lung infiltrates within the lower lobes bilaterally worse on


the left side. Bilateral pneumonia or aspiration pneumonitis. Minimal 


small bilateral pleural effusions are seen.


 


Reactive mediastinal and hilar lymphadenopathy.


 


Calcified atheromatous disease of the coronary arteries. Mild 


cardiomegaly.











AYANNA GREEN MD                 Feb 14, 2020 17:16

## 2020-02-14 NOTE — NUR
SW following. Discussed with RN, pt is from home with family, gets around okay. Pt has 
pneumonia. RN advised no SW needs at this time. SW will continue to follow should any 
discharge needs arise.

## 2020-02-14 NOTE — RAD
CTA OF THE CHEST WITH AND WITHOUT CONTRAST

 

Clinical indications: Shortness of air.

 

Technique: Noncontrast axial localizer was performed. After IV infusion of

100 cc of Omnipaque 350, helical CT scanning of the chest was performed 

using the CT pulmonary embolism protocol. A coronal MIP reconstruction was

generated.

 

PQRS compliance Statement

 

One or more of the following individualized dose reduction techniques were

utilized for this study:

1.  Automated exposure control

2.  Adjustment of the mA and/or kV according to patient size

3.  Use of iterative reconstruction technique

 

Comparison: None available.

 

Findings: Right paratracheal aortic pulmonary window lymph nodes are seen 

which are not significantly enlarged. Bilateral hilar lymph nodes are seen

which are not significantly enlarged. These lymph nodes most likely are 

reactive in nature. No pulmonary embolism is evident. No aneurysmal 

dilatation or dissection of the thoracic aorta is seen. Calcified 

atheromatous disease of the coronary arteries is seen. The heart size is 

mildly enlarged. No pericardial effusion is seen. Minimal bilateral 

pleural effusions are seen there is a dense consolidative lung infiltrate 

or bronchograms within the left lower lobe. Smaller consolidative lung 

field is seen within the posterior and lateral aspects of the right lower 

lobe. No pneumothorax is seen. The proximal bronchial tree is patent. No 

adrenal mass T12 vertebral body is not completely seen in this study. 

Lucency of the superior aspect of the T12 vertebral body is seen. Lytic 

process is possible.

 

IMPRESSION: No pulmonary embolism.

 

Consolidative lung infiltrates within the lower lobes bilaterally worse on

the left side. Bilateral pneumonia or aspiration pneumonitis. Minimal 

small bilateral pleural effusions are seen.

 

Reactive mediastinal and hilar lymphadenopathy.

 

Calcified atheromatous disease of the coronary arteries. Mild 

cardiomegaly.

 

Lucency of the superior aspect of the T12 vertebral body. Lytic process is

possible. Therefore, recommend thoracic spine MRI study for further 

evaluation.

 

Electronically signed by: Jair Duenas MD (2/14/2020 2:53 PM) [id number]

## 2020-02-14 NOTE — CONS
DATE OF CONSULTATION:  02/14/2020



ATTENDING PHYSICIAN:  Ailyn Torre MD



REASON FOR CONSULTATION:  The patient seen in pulmonary consultation at the

request of Dr. Torre for increasing shortness of air, abnormal chest x-ray.



HISTORY OF PRESENT ILLNESS:  The patient is a 75-year-old nonsmoker that

presented to the [facility] couple of days ago.  He started feeling bad

over the weekend, increasing shortness of breath, fever, acute onset of

shortness of breath associated with chest discomfort.  He was seen in the Urgent

Care Center, had a chest x-ray revealing lower lobe "pneumonia."  He was

treated.  He was given doxycycline.  No improvement.  He presented to the

Emergency Room, had a chest x-ray which confirmed an infiltrate, retrocardiac. 

I was asked to see him in consultation.



The patient has no prior history of tobacco use or pulmonary disorders.  He

drives approximately 120 minutes per day sitting in a car.  He denies any lower

extremity pain or edema.  No recent lower extremity trauma.  No prior history of

DVT or pulmonary embolism.



PAST MEDICAL HISTORY:  Coronary artery disease with previous myocardial

infarction in 2003, hypertension, diabetes, obstructive sleep apnea.



PAST SURGICAL HISTORY:  No recent major surgeries.



ALLERGIES:  No known drug allergies.



REVIEW OF SYSTEMS:

CONSTITUTIONAL:  No fever or chills.

EYES:  No change in visual acuity.

HENT:  No nasal congestion or sore throat.

PULMONARY:  As indicated above.

CARDIOVASCULAR:  As indicated above.

GASTROINTESTINAL:  No nausea, vomiting, diarrhea.

GENITOURINARY:  No dysuria or frequency.

MUSCULOSKELETAL:  No localized muscle aches or joint pains.

SKIN:  No new skin rashes.

NEUROLOGIC:  No headaches, diplopia, or blurred vision.



PHYSICAL EXAMINATION:

GENERAL:  Morbid obese individual, in no respiratory distress.

VITAL SIGNS:  Currently on room air.

HEENT:  Eyes, the sclerae were nonicteric.

NECK:  Jugular venous distention could not be assessed secondary to body

habitus.

CHEST:  Full expansion.

LUNGS:  Diminished breath sounds throughout both lung fields.  Some crackles in

the bases.

CARDIOVASCULAR:  Regular rate and rhythm with S1, S2.  No S3.

ABDOMEN:  Soft, nontender, nondistended.

EXTREMITIES:  No clubbing, cyanosis, or edema.



LABORATORY DATA:  Reviewed.  Serology for influenza was negative.  Electrolytes

were noted.  White count was normal.  Chest x-ray as indicated above.



IMPRESSION:

1.  Abnormal x-ray.

2.  Progressive dyspnea.

3.  Pleurisy.



PLAN:  I concur with treatment for pneumonia, suspect Gram-negative. 

Considering the patient's acute onset of shortness of air and his 2-hour drive

on a daily basis, we will proceed with ruling out possibility of PE with

pulmonary infarct.  My clinical suspicion is low.  With that being said, we will

complete workup.



If CT angiogram is negative, may discharge the patient home later on today on

Levaquin.



I do appreciate the privilege in sharing in the patient's care.

 



______________________________

RASHAUN RIBEIRO MD



DR:  VA/shen  JOB#:  [id number] / [id number]

DD:  02/14/2020 12:00  DT:  02/14/2020 14:03

## 2020-02-14 NOTE — PDOC
PULMONARY PROGRESS NOTES


Vitals





Vital Signs








  Date Time  Temp Pulse Resp B/P (MAP) Pulse Ox O2 Delivery O2 Flow Rate FiO2


 


2/14/20 11:15 97.7 70 16 116/74 (88) 96   





 97.7       


 


2/14/20 11:13      Room Air  


 


2/13/20 19:00       2.0 








Labs





Laboratory Tests








Test


 2/13/20


16:00 2/13/20


16:15 2/13/20


20:57 2/14/20


03:30


 


White Blood Count


 10.7 x10^3/uL


(4.0-11.0) 


 


 7.7 x10^3/uL


(4.0-11.0)


 


Red Blood Count


 4.10 x10^6/uL


(4.30-5.70) 


 


 3.67 x10^6/uL


(4.30-5.70)


 


Hemoglobin


 12.6 g/dL


(13.0-17.5) 


 


 11.6 g/dL


(13.0-17.5)


 


Hematocrit


 37.0 %


(39.0-53.0) 


 


 32.7 %


(39.0-53.0)


 


Mean Corpuscular Volume 90 fL ()    89 fL () 


 


Mean Corpuscular Hemoglobin 31 pg (25-35)    32 pg (25-35) 


 


Mean Corpuscular Hemoglobin


Concent 34 g/dL


(31-37) 


 


 36 g/dL


(31-37)


 


Red Cell Distribution Width


 13.6 %


(11.5-14.5) 


 


 13.6 %


(11.5-14.5)


 


Platelet Count


 221 x10^3/uL


(140-400) 


 


 196 x10^3/uL


(140-400)


 


Neutrophils (%) (Auto) 79 % (31-73)    78 % (31-73) 


 


Lymphocytes (%) (Auto) 11 % (24-48)    11 % (24-48) 


 


Monocytes (%) (Auto) 10 % (0-9)    11 % (0-9) 


 


Eosinophils (%) (Auto) 0 % (0-3)    0 % (0-3) 


 


Basophils (%) (Auto) 0 % (0-3)    0 % (0-3) 


 


Neutrophils # (Auto)


 8.4 x10^3/uL


(1.8-7.7) 


 


 6.0 x10^3/uL


(1.8-7.7)


 


Lymphocytes # (Auto)


 1.2 x10^3/uL


(1.0-4.8) 


 


 0.9 x10^3/uL


(1.0-4.8)


 


Monocytes # (Auto)


 1.0 x10^3/uL


(0.0-1.1) 


 


 0.8 x10^3/uL


(0.0-1.1)


 


Eosinophils # (Auto)


 0.0 x10^3/uL


(0.0-0.7) 


 


 0.0 x10^3/uL


(0.0-0.7)


 


Basophils # (Auto)


 0.0 x10^3/uL


(0.0-0.2) 


 


 0.0 x10^3/uL


(0.0-0.2)


 


Sodium Level


 141 mmol/L


(136-145) 


 


 





 


Potassium Level


 3.9 mmol/L


(3.5-5.1) 


 


 





 


Chloride Level


 102 mmol/L


() 


 


 





 


Carbon Dioxide Level


 28 mmol/L


(21-32) 


 


 





 


Anion Gap 11 (6-14)    


 


Blood Urea Nitrogen


 19 mg/dL


(8-26) 


 


 





 


Creatinine


 1.1 mg/dL


(0.7-1.3) 


 


 





 


Estimated GFR


(Cockcroft-Gault) 65.6 


 


 


 





 


BUN/Creatinine Ratio 17 (6-20)    


 


Glucose Level


 137 mg/dL


(70-99) 


 


 





 


Lactic Acid Level


 2.0 mmol/L


(0.4-2.0) 


 


 





 


Calcium Level


 8.9 mg/dL


(8.5-10.1) 


 


 





 


Magnesium Level


 1.4 mg/dL


(1.8-2.4) 


 


 





 


Total Bilirubin


 0.9 mg/dL


(0.2-1.0) 


 


 





 


Aspartate Amino Transf


(AST/SGOT) 23 U/L (15-37) 


 


 


 





 


Alanine Aminotransferase


(ALT/SGPT) 32 U/L (16-63) 


 


 


 





 


Alkaline Phosphatase


 45 U/L


() 


 


 





 


Creatine Kinase


 171 U/L


() 


 


 





 


Creatine Kinase MB (Mass)


 1.5 ng/mL


(0.0-3.6) 


 


 





 


Creatine Kinase MB Relative


Index 0.9 % (0-4) 


 


 


 





 


Troponin I Quantitative


 < 0.017 ng/mL


(0.000-0.055) 


 


 





 


NT-Pro-B-Type Natriuretic


Peptide 939 pg/mL


(0-124) 


 


 





 


Total Protein


 6.6 g/dL


(6.4-8.2) 


 


 





 


Albumin


 3.8 g/dL


(3.4-5.0) 


 


 





 


Albumin/Globulin Ratio 1.4 (1.0-1.7)    


 


Procalcitonin


 < 0.10 ng/mL


(0.00-0.10) 


 


 





 


Influenza Type A Antigen


 


 Negative


(NEGATIVE) 


 





 


Influenza Type B Antigen


 


 Negative


(NEGATIVE) 


 





 


Glucose (Fingerstick)


 


 


 205 mg/dL


(70-99) 





 


Test


 2/14/20


07:49 2/14/20


11:56 


 





 


Glucose (Fingerstick)


 133 mg/dL


(70-99) 117 mg/dL


(70-99) 


 











Laboratory Tests








Test


 2/13/20


16:00 2/13/20


16:15 2/13/20


20:57 2/14/20


03:30


 


White Blood Count


 10.7 x10^3/uL


(4.0-11.0) 


 


 7.7 x10^3/uL


(4.0-11.0)


 


Red Blood Count


 4.10 x10^6/uL


(4.30-5.70) 


 


 3.67 x10^6/uL


(4.30-5.70)


 


Hemoglobin


 12.6 g/dL


(13.0-17.5) 


 


 11.6 g/dL


(13.0-17.5)


 


Hematocrit


 37.0 %


(39.0-53.0) 


 


 32.7 %


(39.0-53.0)


 


Mean Corpuscular Volume 90 fL ()    89 fL () 


 


Mean Corpuscular Hemoglobin 31 pg (25-35)    32 pg (25-35) 


 


Mean Corpuscular Hemoglobin


Concent 34 g/dL


(31-37) 


 


 36 g/dL


(31-37)


 


Red Cell Distribution Width


 13.6 %


(11.5-14.5) 


 


 13.6 %


(11.5-14.5)


 


Platelet Count


 221 x10^3/uL


(140-400) 


 


 196 x10^3/uL


(140-400)


 


Neutrophils (%) (Auto) 79 % (31-73)    78 % (31-73) 


 


Lymphocytes (%) (Auto) 11 % (24-48)    11 % (24-48) 


 


Monocytes (%) (Auto) 10 % (0-9)    11 % (0-9) 


 


Eosinophils (%) (Auto) 0 % (0-3)    0 % (0-3) 


 


Basophils (%) (Auto) 0 % (0-3)    0 % (0-3) 


 


Neutrophils # (Auto)


 8.4 x10^3/uL


(1.8-7.7) 


 


 6.0 x10^3/uL


(1.8-7.7)


 


Lymphocytes # (Auto)


 1.2 x10^3/uL


(1.0-4.8) 


 


 0.9 x10^3/uL


(1.0-4.8)


 


Monocytes # (Auto)


 1.0 x10^3/uL


(0.0-1.1) 


 


 0.8 x10^3/uL


(0.0-1.1)


 


Eosinophils # (Auto)


 0.0 x10^3/uL


(0.0-0.7) 


 


 0.0 x10^3/uL


(0.0-0.7)


 


Basophils # (Auto)


 0.0 x10^3/uL


(0.0-0.2) 


 


 0.0 x10^3/uL


(0.0-0.2)


 


Sodium Level


 141 mmol/L


(136-145) 


 


 





 


Potassium Level


 3.9 mmol/L


(3.5-5.1) 


 


 





 


Chloride Level


 102 mmol/L


() 


 


 





 


Carbon Dioxide Level


 28 mmol/L


(21-32) 


 


 





 


Anion Gap 11 (6-14)    


 


Blood Urea Nitrogen


 19 mg/dL


(8-26) 


 


 





 


Creatinine


 1.1 mg/dL


(0.7-1.3) 


 


 





 


Estimated GFR


(Cockcroft-Gault) 65.6 


 


 


 





 


BUN/Creatinine Ratio 17 (6-20)    


 


Glucose Level


 137 mg/dL


(70-99) 


 


 





 


Lactic Acid Level


 2.0 mmol/L


(0.4-2.0) 


 


 





 


Calcium Level


 8.9 mg/dL


(8.5-10.1) 


 


 





 


Magnesium Level


 1.4 mg/dL


(1.8-2.4) 


 


 





 


Total Bilirubin


 0.9 mg/dL


(0.2-1.0) 


 


 





 


Aspartate Amino Transf


(AST/SGOT) 23 U/L (15-37) 


 


 


 





 


Alanine Aminotransferase


(ALT/SGPT) 32 U/L (16-63) 


 


 


 





 


Alkaline Phosphatase


 45 U/L


() 


 


 





 


Creatine Kinase


 171 U/L


() 


 


 





 


Creatine Kinase MB (Mass)


 1.5 ng/mL


(0.0-3.6) 


 


 





 


Creatine Kinase MB Relative


Index 0.9 % (0-4) 


 


 


 





 


Troponin I Quantitative


 < 0.017 ng/mL


(0.000-0.055) 


 


 





 


NT-Pro-B-Type Natriuretic


Peptide 939 pg/mL


(0-124) 


 


 





 


Total Protein


 6.6 g/dL


(6.4-8.2) 


 


 





 


Albumin


 3.8 g/dL


(3.4-5.0) 


 


 





 


Albumin/Globulin Ratio 1.4 (1.0-1.7)    


 


Procalcitonin


 < 0.10 ng/mL


(0.00-0.10) 


 


 





 


Influenza Type A Antigen


 


 Negative


(NEGATIVE) 


 





 


Influenza Type B Antigen


 


 Negative


(NEGATIVE) 


 





 


Glucose (Fingerstick)


 


 


 205 mg/dL


(70-99) 





 


Test


 2/14/20


07:49 2/14/20


11:56 


 





 


Glucose (Fingerstick)


 133 mg/dL


(70-99) 117 mg/dL


(70-99) 


 











Medications





Active Scripts








 Medications  Dose


 Route/Sig


 Max Daily Dose Days Date Category


 


 Vytorin 10-40 Mg


 Tablet


  (Ezetimibe/Simvastatin)


 1 Each Tablet  1 Tab


 PO HS


  30 2/13/20 Reported


 


 Janumet 50-1,000


 Mg Tablet


  (Sitagliptin


 Phos/Metformin


 Hcl) 1 Each Tablet  1 Tab


 PO BID


   2/13/20 Reported


 


 Lisinopril 10 Mg


 Tablet  1 Tab


 PO DAILYWBKFT


   2/13/20 Reported


 


 Toprol Xl


  (Metoprolol


 Succinate) 25 Mg


 Tab.er.24h  2 Tab


 PO DAILYWBKFT


  30 2/13/20 Reported


 


 Flomax


  (Tamsulosin Hcl)


 0.4 Mg Cap.er.24h  2 Cap


 PO HS


   2/13/20 Reported


 


 Protonix


  (Pantoprazole


 Sodium) 20 Mg


 Tablet.dr  2 Tab


 PO DAILY


   2/13/20 Reported


 


 Aspirin 81 Mg


 Tab.chew  1 Tab


 PO DAILY


   2/13/20 Reported











Impression


.


FULL NOTE DICTATED


SUSPECT PNEUMONIA


WILL RULE OUT PE


IF CT ANGIO IS NEGATIVE OK TO DC LATER TODAY ON [facility]


FOLLOW UP WITH ME IN 4 WEEKS


THANKS











RASHAUN RIBEIRO MD              Feb 14, 2020 12:01

## 2020-02-14 NOTE — NUR
Discharge instructions given with prescription. Answered questions and concerns. Verbalized 
understanding. Pt discharged home accompanied by spouse.